# Patient Record
Sex: FEMALE | Race: WHITE | Employment: OTHER | URBAN - NONMETROPOLITAN AREA
[De-identification: names, ages, dates, MRNs, and addresses within clinical notes are randomized per-mention and may not be internally consistent; named-entity substitution may affect disease eponyms.]

---

## 2018-11-07 ENCOUNTER — HOSPITAL ENCOUNTER (OUTPATIENT)
Dept: GENERAL RADIOLOGY | Age: 73
Discharge: HOME | End: 2018-11-07
Payer: MEDICARE

## 2018-11-07 DIAGNOSIS — K44.9 HIATAL HERNIA: ICD-10-CM

## 2018-11-07 DIAGNOSIS — R13.19 ESOPHAGEAL DYSPHAGIA: ICD-10-CM

## 2018-11-07 PROCEDURE — 74220 X-RAY XM ESOPHAGUS 1CNTRST: CPT

## 2018-11-07 PROCEDURE — A4641 RADIOPHARM DX AGENT NOC: HCPCS | Performed by: INTERNAL MEDICINE

## 2018-11-07 PROCEDURE — 6360000004 HC RX CONTRAST MEDICATION: Performed by: INTERNAL MEDICINE

## 2018-11-07 PROCEDURE — 2500000003 HC RX 250 WO HCPCS: Performed by: INTERNAL MEDICINE

## 2018-11-07 PROCEDURE — 6370000000 HC RX 637 (ALT 250 FOR IP): Performed by: INTERNAL MEDICINE

## 2018-11-07 RX ADMIN — ANTACID/ANTIFLATULENT 1 EACH: 380; 550; 10; 10 GRANULE, EFFERVESCENT ORAL at 08:48

## 2018-11-07 RX ADMIN — BARIUM SULFATE 140 ML: 980 POWDER, FOR SUSPENSION ORAL at 08:48

## 2018-11-07 RX ADMIN — BARIUM SULFATE 100 ML: 0.6 SUSPENSION ORAL at 08:48

## 2020-02-20 ENCOUNTER — HOSPITAL ENCOUNTER (OUTPATIENT)
Dept: GENERAL RADIOLOGY | Age: 75
Discharge: HOME | End: 2020-02-20
Payer: MEDICARE

## 2020-02-20 PROCEDURE — 2500000003 HC RX 250 WO HCPCS: Performed by: INTERNAL MEDICINE

## 2020-02-20 PROCEDURE — 74220 X-RAY XM ESOPHAGUS 1CNTRST: CPT

## 2020-02-20 PROCEDURE — 6360000004 HC RX CONTRAST MEDICATION: Performed by: INTERNAL MEDICINE

## 2020-02-20 PROCEDURE — 6370000000 HC RX 637 (ALT 250 FOR IP): Performed by: INTERNAL MEDICINE

## 2020-02-20 PROCEDURE — A4641 RADIOPHARM DX AGENT NOC: HCPCS | Performed by: INTERNAL MEDICINE

## 2020-02-20 RX ADMIN — BARIUM SULFATE 140 ML: 980 POWDER, FOR SUSPENSION ORAL at 10:13

## 2020-02-20 RX ADMIN — ANTACID/ANTIFLATULENT 1 EACH: 380; 550; 10; 10 GRANULE, EFFERVESCENT ORAL at 10:14

## 2020-02-20 RX ADMIN — BARIUM SULFATE 125 ML: 0.6 SUSPENSION ORAL at 10:13

## 2021-10-06 ENCOUNTER — HOSPITAL ENCOUNTER (OUTPATIENT)
Age: 76
Setting detail: OUTPATIENT SURGERY
Discharge: HOME | End: 2021-10-06
Attending: INTERNAL MEDICINE | Admitting: INTERNAL MEDICINE
Payer: MEDICARE

## 2021-10-06 ENCOUNTER — ANESTHESIA EVENT (OUTPATIENT)
Dept: ENDOSCOPY | Age: 76
End: 2021-10-06
Payer: MEDICARE

## 2021-10-06 ENCOUNTER — ANESTHESIA (OUTPATIENT)
Dept: ENDOSCOPY | Age: 76
End: 2021-10-06
Payer: MEDICARE

## 2021-10-06 VITALS
RESPIRATION RATE: 13 BRPM | DIASTOLIC BLOOD PRESSURE: 62 MMHG | OXYGEN SATURATION: 100 % | SYSTOLIC BLOOD PRESSURE: 126 MMHG

## 2021-10-06 VITALS
OXYGEN SATURATION: 97 % | HEART RATE: 89 BPM | TEMPERATURE: 97.7 F | SYSTOLIC BLOOD PRESSURE: 133 MMHG | WEIGHT: 125.8 LBS | DIASTOLIC BLOOD PRESSURE: 65 MMHG | BODY MASS INDEX: 22.29 KG/M2 | HEIGHT: 63 IN | RESPIRATION RATE: 18 BRPM

## 2021-10-06 PROCEDURE — 2500000003 HC RX 250 WO HCPCS

## 2021-10-06 PROCEDURE — C1726 CATH, BAL DIL, NON-VASCULAR: HCPCS | Performed by: INTERNAL MEDICINE

## 2021-10-06 PROCEDURE — 3700000000 HC ANESTHESIA ATTENDED CARE: Performed by: INTERNAL MEDICINE

## 2021-10-06 PROCEDURE — 3609017700 HC EGD DILATION GASTRIC/DUODENAL STRICTURE: Performed by: INTERNAL MEDICINE

## 2021-10-06 PROCEDURE — 3700000001 HC ADD 15 MINUTES (ANESTHESIA): Performed by: INTERNAL MEDICINE

## 2021-10-06 PROCEDURE — 2720000010 HC SURG SUPPLY STERILE: Performed by: INTERNAL MEDICINE

## 2021-10-06 PROCEDURE — 7100000011 HC PHASE II RECOVERY - ADDTL 15 MIN: Performed by: INTERNAL MEDICINE

## 2021-10-06 PROCEDURE — 2580000003 HC RX 258: Performed by: INTERNAL MEDICINE

## 2021-10-06 PROCEDURE — 7100000010 HC PHASE II RECOVERY - FIRST 15 MIN: Performed by: INTERNAL MEDICINE

## 2021-10-06 PROCEDURE — 6360000002 HC RX W HCPCS

## 2021-10-06 PROCEDURE — 2709999900 HC NON-CHARGEABLE SUPPLY: Performed by: INTERNAL MEDICINE

## 2021-10-06 RX ORDER — SODIUM CHLORIDE 0.9 % (FLUSH) 0.9 %
5-40 SYRINGE (ML) INJECTION EVERY 12 HOURS SCHEDULED
Status: DISCONTINUED | OUTPATIENT
Start: 2021-10-06 | End: 2021-10-06 | Stop reason: HOSPADM

## 2021-10-06 RX ORDER — SODIUM CHLORIDE 9 MG/ML
25 INJECTION, SOLUTION INTRAVENOUS PRN
Status: DISCONTINUED | OUTPATIENT
Start: 2021-10-06 | End: 2021-10-06 | Stop reason: HOSPADM

## 2021-10-06 RX ORDER — GLYCOPYRROLATE 1 MG/5 ML
SYRINGE (ML) INTRAVENOUS PRN
Status: DISCONTINUED | OUTPATIENT
Start: 2021-10-06 | End: 2021-10-06 | Stop reason: SDUPTHER

## 2021-10-06 RX ORDER — LIDOCAINE HYDROCHLORIDE 10 MG/ML
INJECTION, SOLUTION INFILTRATION; PERINEURAL PRN
Status: DISCONTINUED | OUTPATIENT
Start: 2021-10-06 | End: 2021-10-06 | Stop reason: SDUPTHER

## 2021-10-06 RX ORDER — SODIUM CHLORIDE 0.9 % (FLUSH) 0.9 %
5-40 SYRINGE (ML) INJECTION PRN
Status: DISCONTINUED | OUTPATIENT
Start: 2021-10-06 | End: 2021-10-06 | Stop reason: HOSPADM

## 2021-10-06 RX ORDER — PROPOFOL 10 MG/ML
INJECTION, EMULSION INTRAVENOUS PRN
Status: DISCONTINUED | OUTPATIENT
Start: 2021-10-06 | End: 2021-10-06 | Stop reason: SDUPTHER

## 2021-10-06 RX ADMIN — PROPOFOL 20 MG: 10 INJECTION, EMULSION INTRAVENOUS at 09:57

## 2021-10-06 RX ADMIN — LIDOCAINE HYDROCHLORIDE 50 MG: 10 INJECTION, SOLUTION INFILTRATION; PERINEURAL at 09:55

## 2021-10-06 RX ADMIN — Medication 0.1 MG: at 09:56

## 2021-10-06 RX ADMIN — PROPOFOL 30 MG: 10 INJECTION, EMULSION INTRAVENOUS at 09:55

## 2021-10-06 RX ADMIN — PROPOFOL 20 MG: 10 INJECTION, EMULSION INTRAVENOUS at 10:05

## 2021-10-06 RX ADMIN — SODIUM CHLORIDE 1000 ML: 9 INJECTION, SOLUTION INTRAVENOUS at 09:45

## 2021-10-06 RX ADMIN — PROPOFOL 20 MG: 10 INJECTION, EMULSION INTRAVENOUS at 10:00

## 2021-10-06 RX ADMIN — PROPOFOL 20 MG: 10 INJECTION, EMULSION INTRAVENOUS at 10:07

## 2021-10-06 RX ADMIN — PROPOFOL 30 MG: 10 INJECTION, EMULSION INTRAVENOUS at 10:03

## 2021-10-06 ASSESSMENT — PAIN SCALES - GENERAL
PAINLEVEL_OUTOF10: 0
PAINLEVEL_OUTOF10: 0

## 2021-10-06 ASSESSMENT — PAIN - FUNCTIONAL ASSESSMENT: PAIN_FUNCTIONAL_ASSESSMENT: 0-10

## 2021-10-06 NOTE — ANESTHESIA POSTPROCEDURE EVALUATION
Department of Anesthesiology  Postprocedure Note    Patient: Jessica Rogers  MRN: [id number]  YOB: 1945  Date of evaluation: 10/6/2021  Time:  4:26 PM     Procedure Summary     Date: 10/06/21 Room / Location: [address] [place] [address]    Anesthesia Start: 9380 Anesthesia Stop: 1013    Procedure: EGD DILATION BALLOON Diagnosis: (DYSPHAGIA, ESOPHAGEAL STRICTURE)    Surgeons: Kan Claudio MD Responsible Provider: Callum Baird DO    Anesthesia Type: MAC ASA Status: 2          Anesthesia Type: MAC    Estefany Phase I: Estefany Score: 9    Estefany Phase II: Estefany Score: 10    Last vitals: Reviewed and per EMR flowsheets.        Anesthesia Post Evaluation    Patient location during evaluation: PACU  Patient participation: complete - patient participated  Level of consciousness: awake and alert  Pain score: 0  Airway patency: patent  Nausea & Vomiting: no nausea and no vomiting  Complications: no  Cardiovascular status: hemodynamically stable and blood pressure returned to baseline  Respiratory status: spontaneous ventilation, room air and acceptable  Hydration status: stable

## 2021-10-06 NOTE — BRIEF OP NOTE
Brief Postoperative Note      Patient: Kathy Bella  YOB: 1945  MRN: [id number]    Date of Procedure: 10/6/2021    Pre-Op Diagnosis: DYSPHAGIA, ESOPHAGEAL STRICTURE    Post-Op Diagnosis: Distal esophageal strictures with surgical changes due to resection of distal esophageal diverticuli and fundoplication successfully dilated size15 mm or 39 Stateless of distal esophagus. Procedure(s):  EGD DILATION BALLOON    Surgeon(s):  Christine Sin MD    Assistant:  * No surgical staff found *    Anesthesia: * No anesthesia type entered *    Estimated Blood Loss (mL): Minimal    Complications: None    Specimens:   * No specimens in log *    Implants:  * No implants in log *      Drains: * No LDAs found *    Findings:  Distal esophageal strictures with surgical changes due to resection of distal esophageal diverticuli and fundoplication successfully dilated size15 mm or 39 Stateless of distal esophagus.       Electronically signed by Christine Sin MD on 10/6/2021 at 10:11 AM

## 2021-10-06 NOTE — PROGRESS NOTES
Brit admitted to [facility] for egd with Dr. Tereza Schafer. Consent forms signed and verified.

## 2021-10-06 NOTE — PROGRESS NOTES
Recovery mode, denies discomfort. Taking fluids. Dr. Nikkie Bond discussed findings with pt and . Discharge instructions provided and understanding verbalized.

## 2021-10-06 NOTE — H&P
Sedation/Analgesia History & Physical    Patient: Nolan Sanabria :   [facility] Rec#: [id number] Acc#: [id number]   Provider Performing Procedure: Harleen Stewart MD  Primary Care Physician: Lily Ewing MD    PRE-PROCEDURE   Full CODE [x]Yes  DNR-CCA/DNR-CC []Yes   Brief History/Pre-Procedure Diagnosis:    1. Severe strictures, upper adult scope could not pass. 2. Surgical changes due to diverticulum removed as well as fundoplication. 3. History of migraine. Taking high dose of NSAID around 200 mg three times a day because of migraine as well as muscle spasm problem in the neck. MEDICAL HISTORY    []Additional information:       has a past medical history of Arthritis, Constipation, Cystocele, Rectocele, Sciatic pain, and Spasmodic torticollis. SOCIAL HISTORY  Social History     Tobacco History     Smoking Status  Never Smoker    Smokeless Tobacco Use  Never Used          Alcohol History     Alcohol Use Status  No          Drug Use     Drug Use Status  No          Sexual Activity     Sexually Active  Not Asked                FAMILY HISTORY     Family History   Problem Relation Age of Onset    Cancer Mother         lung    High Blood Pressure Father     Heart Attack Paternal Grandmother     Colon Cancer Neg Hx        SURGICAL HISTORY   has a past surgical history that includes Tubal ligation (); Vein Surgery (); Eye surgery (?); back surgery (); Neck surgery (); bladder suspension (); eye surgery (); Cystoscopy (12); Hysterectomy (2013); Dilation and curettage of uterus; Varicose vein surgery; blepharoplasty; back surgery; Bladder surgery; Colonoscopy (); and EGD (,,).   Additional information:       ALLERGIES   Allergies as of 2021 - Fully Reviewed 2021   Allergen Reaction Noted    Hydrocodone Nausea And Vomiting 10/29/2013     Additional information:       MEDICATIONS   Coumadin Use Last 7 Days [x]No []Yes  Antiplatelet drug therapy use last 7 days  [x]No []Yes  Other anticoagulant use last 7 days  [x]No []Yes    Current Facility-Administered Medications:     sodium chloride flush 0.9 % injection 5-40 mL, 5-40 mL, IntraVENous, 2 times per day, Elizabeth Zapata MD    sodium chloride flush 0.9 % injection 5-40 mL, 5-40 mL, IntraVENous, PRN, Elizabeth Zapata MD    0.9 % sodium chloride infusion, 25 mL, IntraVENous, PRN, Elizabeth Zapata MD, Last Rate: 100 mL/hr at 10/06/21 0945, 1,000 mL at 10/06/21 0945  Prior to Admission medications    Medication Sig Start Date End Date Taking? Authorizing Provider   esomeprazole (NEXIUM) 40 MG delayed release capsule Take 40 mg by mouth every morning (before breakfast)   Yes Historical Provider, MD   SUMAtriptan Succinate 6 MG/0.5ML SOAJ USE AS DIRECTED TAKE 1 DOSE AT ONSET MAY REPEAT 2ND DOSE IF NEEDED IN 2 HOURS IN NO IMPROVEMENT MAX 2 IN 24 HOURS 8/10/21  Yes Historical Provider, MD   topiramate (TOPAMAX) 25 MG tablet TAKE 1 TABLET BY MOUTH DAILY FOR 7 DAYS 1 TAB TWICE DAILY FOR 7DAYS 1 TAB IN THE MORNING AND 2 IN THE EVENING FOR 7 DAYS THEN 2 TABS TWICE D 6/9/21  Yes Historical Provider, MD   Cholecalciferol (VITAMIN D3) 5000 units CAPS Take by mouth   Yes Historical Provider, MD   Saccharomyces boulardii (PROBIOTIC) 250 MG CAPS Take by mouth   Yes Historical Provider, MD   ibuprofen (ADVIL;MOTRIN) 200 MG tablet Take 600 mg by mouth 2 times daily   Yes Historical Provider, MD   LORazepam (ATIVAN) 1 MG tablet Take 2 mg by mouth three times daily. Yes Historical Provider, MD   ZOLMitriptan (ZOMIG) 2.5 MG tablet Take 2.5 mg by mouth as needed.      Yes Historical Provider, MD   NONFORMULARY     Historical Provider, MD   NONFORMULARY     Historical Provider, MD   NONFORMULARY 2 times daily    Historical Provider, MD   NONFORMULARY     Historical Provider, MD   NONFORMULARY     Historical Provider, MD   [facility] Liver Oil 1000 MG CAPS Take by mouth 2 times daily    Historical Provider, MD NONFORMULARY Take 4 capsules by mouth daily as needed OTC [employer]'s Colon Cleanse Pills    Historical Provider, MD     Additional information:       PHYSICAL:   Heart:  [x]Regular rate and rhythm  []Other:    Lungs:  [x]Clear    []Other:    Abdomen: [x]Soft    []Other:    Mental Status: [x]Alert & Oriented  []Other:        PLANNED PROCEDURE   [x]EGD  []Colonoscopy []Flex Sigmoid     Consent: I have discussed with the patient and/or the patient representative the indication, alternatives, and the possible risks and/or complications of the planned procedure and the anesthesia methods. The patient and/or patient representative appear to understand and agree to proceed. SEDATION  Please see anesthesia note. The medication Planned :  Planned agent:[x]Midazolam []Meperidine [x]Sublimaze []Morphine  []Diazepam  [x]Propofol     Airway Assessment:   See anesthesia no please     Monitoring and Safety: The patient will be placed on a cardiac monitor and vital signs, pulse oximetry and level of consciousness will be continuously evaluated throughout the procedure. The patient will be closely monitored until recovery from the medications is complete and the patient has returned to baseline status. Respiratory therapy will be on standby during the procedure. [x]Pre-procedure diagnostic studies complete and results available. Comment:    [x]Previous sedation/anesthesia experiences assessed. Comment:    [x]The patient is an appropriate candidate to undergo the planned procedure sedation and anesthesia. (Refer to nursing sedation/analgesia documentation record)  [x]Formulation and discussion of sedation/procedure plan, risks, and expectations with patient and/or responsible adult completed. [x]Patient examined immediately prior to the procedure.  (Refer to nursing sedation/analgesia documentation record)    Victor Hugo Chang MD, MD   Electronically signed

## 2021-10-06 NOTE — PROGRESS NOTES
EGD completed. Tolerated well. Photos taken. No biopsies. Esophageal dilation with 12-13.5-15mm balloon dilator. Scope [id number].

## 2021-10-06 NOTE — ANESTHESIA PRE PROCEDURE
Department of Anesthesiology  Preprocedure Note       Name:  Nola Vital   Age:  78 y.o.  :  1945                                          MRN:  [id number]         Date:  10/6/2021      Surgeon: Do Swanson):  Hali Palacios MD    Procedure: Procedure(s):  EGD WITH N180 SCOPE    Medications prior to admission:   Prior to Admission medications    Medication Sig Start Date End Date Taking? Authorizing Provider   esomeprazole (NEXIUM) 40 MG delayed release capsule Take 40 mg by mouth every morning (before breakfast)   Yes Historical Provider, MD   SUMAtriptan Succinate 6 MG/0.5ML SOAJ USE AS DIRECTED TAKE 1 DOSE AT ONSET MAY REPEAT 2ND DOSE IF NEEDED IN 2 HOURS IN NO IMPROVEMENT MAX 2 IN 24 HOURS 8/10/21  Yes Historical Provider, MD   topiramate (TOPAMAX) 25 MG tablet TAKE 1 TABLET BY MOUTH DAILY FOR 7 DAYS 1 TAB TWICE DAILY FOR 7DAYS 1 TAB IN THE MORNING AND 2 IN THE EVENING FOR 7 DAYS THEN 2 TABS TWICE D 21  Yes Historical Provider, MD   Cholecalciferol (VITAMIN D3) 5000 units CAPS Take by mouth   Yes Historical Provider, MD   Saccharomyces boulardii (PROBIOTIC) 250 MG CAPS Take by mouth   Yes Historical Provider, MD   ibuprofen (ADVIL;MOTRIN) 200 MG tablet Take 600 mg by mouth 2 times daily   Yes Historical Provider, MD   LORazepam (ATIVAN) 1 MG tablet Take 2 mg by mouth three times daily. Yes Historical Provider, MD   ZOLMitriptan (ZOMIG) 2.5 MG tablet Take 2.5 mg by mouth as needed.      Yes Historical Provider, MD   NONFORMULARY     Historical MD Lamberto   NONFORMULARY     Historical Provider, MD   NONFORMULARY 2 times daily    Historical MD Lamberto   NONFORMULARY     Historical Provider, MD   NONFORMULARY     Historical Provider, MD   [facility] Liver Oil 1000 MG CAPS Take by mouth 2 times daily    Historical Provider, MD   NONFORMULARY Take 4 capsules by mouth daily as needed OTC Jeramie's Colon Cleanse Pills    Historical Provider, MD       Current medications:    Current Facility-Administered Medications   Medication Dose Route Frequency Provider Last Rate Last Admin    sodium chloride flush 0.9 % injection 5-40 mL  5-40 mL IntraVENous 2 times per day Lopez Julio MD        sodium chloride flush 0.9 % injection 5-40 mL  5-40 mL IntraVENous PRN Lopez Julio MD        0.9 % sodium chloride infusion  25 mL IntraVENous PRN Lopez Julio MD           Allergies: Allergies   Allergen Reactions    Hydrocodone Nausea And Vomiting       Problem List:    Patient Active Problem List   Diagnosis Code    Cystocele [id number]    Pelvic prolapse N81.9       Past Medical History:        Diagnosis Date    Arthritis     Constipation     Cystocele     Rectocele     Sciatic pain     Spasmodic torticollis        Past Surgical History:        Procedure Laterality Date    BACK SURGERY  2009    lumbar    BACK SURGERY      sciatica    BLADDER SURGERY      BLADDER SUSPENSION  4/12    BLEPHAROPLASTY      COLONOSCOPY  2018    CYSTOSCOPY  11/29/12    DILATION AND CURETTAGE OF UTERUS      EGD  2018,2019,2020    EYE SURGERY  1999?  EYE SURGERY  1997    HYSTERECTOMY  01/18/2013    Robotic Hysterectomy, Sacral Colpopexy    NECK SURGERY  2001    TUBAL LIGATION  1987    VARICOSE VEIN SURGERY      VEIN SURGERY  1987       Social History:    Social History     Tobacco Use    Smoking status: Never Smoker    Smokeless tobacco: Never Used   Substance Use Topics    Alcohol use:  No                                Counseling given: Not Answered      Vital Signs (Current):   Vitals:    10/06/21 0924   BP: (!) 168/82   Pulse: 94   Resp: 18   Temp: 36.6 °C (97.8 °F)   TempSrc: Tympanic   SpO2: 99%   Weight: 125 lb 12.8 oz (57.1 kg)   Height: 5' 3\" (1.6 m)                                              BP Readings from Last 3 Encounters:   10/06/21 (!) 168/82   08/18/21 (!) 143/78   03/04/20 (!) 147/84       NPO Status: Time of last liquid consumption: 0700                        Time of last solid consumption: 1800 Date of last liquid consumption: 10/06/21                        Date of last solid food consumption: 10/05/21    BMI:   Wt Readings from Last 3 Encounters:   10/06/21 125 lb 12.8 oz (57.1 kg)   08/18/21 125 lb (56.7 kg)   03/04/20 122 lb (55.3 kg)     Body mass index is 22.28 kg/m². CBC:   Lab Results   Component Value Date    WBC 5.1 08/03/2016    RBC 3.43 08/03/2016    HGB 10.8 08/03/2016    HCT 32.3 08/03/2016    MCV 94.3 08/03/2016    RDW 13.2 08/03/2016     08/03/2016       CMP:   Lab Results   Component Value Date     08/03/2016    K 3.1 08/03/2016     08/03/2016    CO2 26 08/03/2016    BUN 8 08/03/2016    CREATININE 0.8 08/03/2016    LABGLOM 71 08/03/2016    GLUCOSE 105 08/03/2016    PROT 6.8 08/03/2016    CALCIUM 9.2 08/03/2016    BILITOT 0.4 08/03/2016    ALKPHOS 96 08/03/2016    AST 20 08/03/2016    ALT 11 08/03/2016       POC Tests: No results for input(s): POCGLU, POCNA, POCK, POCCL, POCBUN, POCHEMO, POCHCT in the last 72 hours.     Coags: No results found for: PROTIME, INR, APTT    HCG (If Applicable): No results found for: PREGTESTUR, PREGSERUM, HCG, HCGQUANT     ABGs: No results found for: PHART, PO2ART, YQD3FQN, CUB5PGJ, BEART, Y2XMWKTU     Type & Screen (If Applicable):  Lab Results   Component Value Date    LABRH POS 01/18/2013       Drug/Infectious Status (If Applicable):  No results found for: HIV, HEPCAB    COVID-19 Screening (If Applicable): No results found for: COVID19        Anesthesia Evaluation  Patient summary reviewed and Nursing notes reviewed  Airway: Mallampati: IV  TM distance: <3 FB   Neck ROM: limited  Mouth opening: < 3 FB Dental:          Pulmonary: breath sounds clear to auscultation                             Cardiovascular:Negative CV ROS            Rhythm: regular                      Neuro/Psych:               GI/Hepatic/Renal: Neg GI/Hepatic/Renal ROS            Endo/Other: Negative Endo/Other ROS                    Abdominal: Vascular: Other Findings:           Anesthesia Plan      MAC     ASA 2       Induction: intravenous. Anesthetic plan and risks discussed with patient.                       NICO Palacio - CRNA   10/6/2021

## 2021-10-07 NOTE — OP NOTE
[address] [place] [address]                                OPERATIVE REPORT    PATIENT NAME: Esteban Gifford                  :        1945  MED REC NO:   [id number]                           ROOM:  ACCOUNT NO:   [de-identified]                           ADMIT DATE: 10/06/2021  PROVIDER:     NADINE Chacko Rump:  10/06/2021    INDICATION:  The patient with dysphagia; has history of distal  esophageal diverticulum, treated surgically with fundoplication done at  [facility]; having difficulty to swallow, benefitted from upper  endoscopy and dilation in the past.  Plan today for upper endoscopy to  evaluate with dilation. ASA CLASSIFICATION:  Please see the Anesthesia note. ESTIMATED BLOOD LOSS:  Minimum. SURGEON:  Fatuma Worley MD    DESCRIPTION OF PROCEDURE:  The patient was brought to the GI lab. Consent was obtained. Risks involved with the procedure explained to  the patient. Informed consent obtained. The patient was monitored  during procedure with pulse oximetry, blood pressure monitoring, and  oxygen by nasal cannula. Sedation by incremental doses of IV propofol  given by the Anesthesia Service to achieve total IV anesthesia. For ASA  classification and medication given during the procedure, please see  Anesthesia note. PROCEDURE PERFORMED:  EGD with balloon dilation in the distal esophagus  up to size 15-mm or 45-Persian. Standard video upper scope was advanced under direct vision from the  oral cavity up to the distal esophagus. Features of stricture seen. I  think at this time this is consistent with chronic acid reflux as well  as surgical change with some suture seen. With gentle manipulation, I  was able to go with slight difficulty into the gastric lumen.   The scope  advanced to antrum, appeared normal.  Pylorus appears normal.  Retroflex  examination of the cardia revealed surgical change from fundoplication  done in the past.  Balloon with maximum dimension of 15 mm positioned in  the distal esophagus, inflated first to 4 mm, then 13.5 mm and then 15  mm. Slightly bleeding with this dilation seen. Balloon was withdrawn. Scope was advanced again and subsequently dilation slightly bleed, not  very significant. Scope withdrawn without any immediate complication. IMPRESSION:  1. Significant distal esophageal sphincter. Scope could not advance  without maneuvering it in.  2.  Surgical change from fundoplication done in the past as well as  dissection of the distal esophageal diverticulum. PLAN:  1. Soft diet for a week. 2.  Recommend soft diet over long period of time. Chew food as much as  possible. Avoid large bite with significant obstruction of the  esophagus to prevent food impaction. 3.  I recommend the patient to stay on PPI. If she stays on PPI, her  need for upper endoscopy and dilation will be less likely. The patient  will follow GI clinic as needed to repeat upper endoscopy with dilation  as needed.         Tanesha Burk M.D.    D: 10/06/2021 10:27:52       T: 10/06/2021 13:52:55     [email or web address]  Job#: [id number]     Doc#: [id number]    CC:  Woodford Simmonds, Md

## (undated) DEVICE — GLOVE ORTHO 8   MSG9480

## (undated) DEVICE — CATHETER DIL 6FR L180CM BLLN INFLATED 36-40.5-45FR L8CM ES

## (undated) DEVICE — BIOGUARD A/W CLEANING ADAPTER